# Patient Record
Sex: MALE | Race: WHITE | Employment: FULL TIME | URBAN - METROPOLITAN AREA
[De-identification: names, ages, dates, MRNs, and addresses within clinical notes are randomized per-mention and may not be internally consistent; named-entity substitution may affect disease eponyms.]

---

## 2018-07-04 ENCOUNTER — HOSPITAL ENCOUNTER (EMERGENCY)
Age: 28
Discharge: HOME | End: 2018-07-04
Attending: EMERGENCY MEDICINE
Payer: COMMERCIAL

## 2018-07-04 VITALS
OXYGEN SATURATION: 100 % | BODY MASS INDEX: 25.46 KG/M2 | TEMPERATURE: 98 F | SYSTOLIC BLOOD PRESSURE: 166 MMHG | HEIGHT: 67 IN | DIASTOLIC BLOOD PRESSURE: 80 MMHG | HEART RATE: 67 BPM | RESPIRATION RATE: 18 BRPM | WEIGHT: 162.2 LBS

## 2018-07-04 DIAGNOSIS — K29.00 ACUTE SUPERFICIAL GASTRITIS WITHOUT HEMORRHAGE: Primary | ICD-10-CM

## 2018-07-04 PROCEDURE — 81003 URINALYSIS AUTO W/O SCOPE: CPT

## 2018-07-04 PROCEDURE — 6370000000 HC RX 637 (ALT 250 FOR IP): Performed by: NURSE PRACTITIONER

## 2018-07-04 PROCEDURE — 99283 EMERGENCY DEPT VISIT LOW MDM: CPT

## 2018-07-04 RX ORDER — OMEPRAZOLE AND SODIUM BICARBONATE 40; 1100 MG/1; MG/1
1 CAPSULE ORAL
Qty: 30 CAPSULE | Refills: 0 | Status: SHIPPED | OUTPATIENT
Start: 2018-07-04 | End: 2019-11-06 | Stop reason: ALTCHOICE

## 2018-07-04 RX ADMIN — LIDOCAINE HYDROCHLORIDE: 20 SOLUTION ORAL; TOPICAL at 21:42

## 2018-07-04 ASSESSMENT — PAIN DESCRIPTION - FREQUENCY: FREQUENCY: CONTINUOUS

## 2018-07-04 ASSESSMENT — PAIN DESCRIPTION - LOCATION: LOCATION: ABDOMEN

## 2018-07-04 ASSESSMENT — PAIN DESCRIPTION - PAIN TYPE: TYPE: ACUTE PAIN

## 2018-07-04 ASSESSMENT — PAIN DESCRIPTION - ORIENTATION: ORIENTATION: UPPER

## 2018-07-04 ASSESSMENT — PAIN SCALES - GENERAL: PAINLEVEL_OUTOF10: 8

## 2018-07-05 NOTE — ED PROVIDER NOTES
[facility] ED  eMERGENCY dEPARTMENT eNCOUnter      Pt Name: Rishi Henning  MRN: [id number]  [place] 1990  Date of evaluation: 7/4/2018  Provider: DHEERAJ Vázquez 5078       Chief Complaint   Patient presents with    Abdominal Pain    Emesis         HISTORY OF PRESENT ILLNESS  (Location/Symptom, Timing/Onset, Context/Setting, Quality, Duration, Modifying Factors, Severity.)   Rishi Henning is a 32 y.o. male who presents to the emergency department Complains of epigastric pain. Onset at 2 PM today. He states he had a burning cramping type pain he ate dinner and the pain became worse. He vomited once. He denies any blood in the vomit. He was nauseated at that time also. He took Gas-X without much improvement in the symptoms. He denies any hematochezia or hematemesis or melena. No sick contacts. He is a nonsmoker. He only occasionally drinks, approximately every 2 months. No intake recently. Nursing Notes were reviewed. ALLERGIES     Patient has no known allergies. CURRENT MEDICATIONS       Discharge Medication List as of 7/4/2018 10:09 PM      CONTINUE these medications which have NOT CHANGED    Details   HYDROcodone-acetaminophen (NORCO) 5-325 MG per tablet take 1-2 tablets by mouth every 6 hours if needed for pain, R-0      gabapentin (NEURONTIN) 100 MG capsule Take 1 capsule by mouth 3 times daily, Disp-90 capsule, R-3      clotrimazole-betamethasone (LOTRISONE) 1-0.05 % cream Apply topically 2 times daily. , Disp-15 g, R-0, Normal      hydrocortisone 2.5 % cream Apply topically 2 times daily. , Disp-15 g, R-0, Normal      hydrocortisone valerate (WESTCORT) 0.2 % ointment Apply topically daily. , Disp-15 g, R-1, Normal      ibuprofen (ADVIL;MOTRIN) 800 MG tablet Take 1 tablet by mouth every 8 hours as needed for Pain, Disp-30 tablet, R-0             PAST MEDICAL HISTORY   No past medical history on file.     SURGICAL HISTORY           Procedure Laterality Date    SHOULDER ARTHROSCOPY Right     SHOULDER SURGERY Right          FAMILY HISTORY       Family History   Problem Relation Age of Onset    Diabetes Paternal Grandmother     Cancer Paternal Grandmother         stomach    Cancer Paternal Uncle      Family Status   Relation Status    Mother Alive    Father Alive    [address]     PUn (Not Specified)        SOCIAL HISTORY      reports that he has been smoking. He has been smoking about 0.50 packs per day. He has never used smokeless tobacco. He reports that he drinks alcohol. He reports that he does not use drugs. REVIEW OF SYSTEMS    (2-9 systems for level 4, 10 or more for level 5)     REVIEW OF SYSTEMS    Constitutional:  Denies fever, chills, or weakness   Eyes:  Denies vision changes  HENT:  Denies sore throat or neck pain   Respiratory:  Denies cough or shortness of breath   Cardiovascular:  Denies chest pain  GI:  Complains of epigastric abdominal pain, nausea, vomiting. No diarrhea. Musculoskeletal:  Denies back pain   Skin:  Denies rash  : All systems negative except as marked. Except as noted above the remainder of the review of systems was reviewed and negative. PHYSICAL EXAM    (up to 7 for level 4, 8 or more for level 5)     ED Triage Vitals [18 2103]   BP Temp Temp Source Pulse Resp SpO2 Height Weight   (!) 166/80 98 °F (36.7 °C) Oral 67 18 100 % 5' 7\" (1.702 m) 162 lb 3.2 oz (73.6 kg)       General Appearance:  Alert, cooperative, no distress, appears stated age. Head:  Normocephalic, without obvious abnormality, atraumatic. Eyes:  conjunctiva/corneas clear, EOM's intact. Sclera anicteric. ENT: Mucous membranes moist.   Neck: Supple, symmetrical, trachea midline, no adenopathy. Lungs:   Respirations eupneic. Lungs CTA   Chest Wall:  Nontender   Heart:   Abdomen:           Extremities:   Pulses:   Skin:   Neurologic:      RRR  Soft,Mildly tender over the epigastric area. There is no guarding.   Bowel sounds CNP  07/05/18 0149

## 2019-11-06 PROBLEM — G50.0 TRIGEMINAL NEURALGIA: Status: ACTIVE | Noted: 2019-11-06

## 2020-01-03 ENCOUNTER — HOSPITAL ENCOUNTER (OUTPATIENT)
Dept: GENERAL RADIOLOGY | Facility: CLINIC | Age: 30
Discharge: HOME | End: 2020-01-05
Payer: COMMERCIAL

## 2020-01-03 PROCEDURE — 73110 X-RAY EXAM OF WRIST: CPT

## 2020-02-13 ENCOUNTER — OFFICE VISIT (OUTPATIENT)
Dept: NEUROLOGY | Age: 30
End: 2020-02-13
Payer: COMMERCIAL

## 2020-02-13 VITALS
HEART RATE: 82 BPM | SYSTOLIC BLOOD PRESSURE: 137 MMHG | BODY MASS INDEX: 29.73 KG/M2 | DIASTOLIC BLOOD PRESSURE: 76 MMHG | HEIGHT: 67 IN | WEIGHT: 189.4 LBS

## 2020-02-13 PROCEDURE — 99204 OFFICE O/P NEW MOD 45 MIN: CPT | Performed by: PSYCHIATRY & NEUROLOGY

## 2020-02-13 RX ORDER — BACLOFEN 10 MG/1
TABLET ORAL
Qty: 30 TABLET | Refills: 2 | Status: SHIPPED | OUTPATIENT
Start: 2020-02-13 | End: 2021-02-13

## 2020-02-13 RX ORDER — CARBAMAZEPINE 100 MG/1
100 TABLET, CHEWABLE ORAL 2 TIMES DAILY
Qty: 120 TABLET | Refills: 2 | Status: SHIPPED | OUTPATIENT
Start: 2020-02-13

## 2020-02-13 NOTE — PROGRESS NOTES
NEUROLOGY CONSULT  Patient Name:       Hossein Barclay  :        1990  Clinic Visit Date:    2020    Dear Dr. Benjamin Wellington MD     I had the opportunity to see your patient, Mr. Hossein Barclay in neurology consultation today. As you know he  is a 30 y.o. right handed  male presented with c/o right facial pain. He stated that he was diagnosed with trigeminal neuralgia by neurologist in [facility] clinic and that neurologist is not practicing anymore. Therefore he comes to clinic for continuation of the care. His right facial pain is moderately intense in severity worsening up to 10/10 on occasion and pain gets worse with ice water. This pain is predominantly located in right lower face. Chewing helps actually but not making it worse. At times this pain gets so severe that he clenches his teeth with resultant severe soreness. He has had difficulty sleeping with this ongoing pain. He also stated that this pain has been persistent since 2015. The onset of facial pain in ; he was evaluated by 2 different dentists. They did not find any pathology with his dentition. It was initially thought to be sinus infection and he has had CT head for evaluation of sinus and it was negative. Then he was referred to a neurologist at [facility]. He was evaluated by Dr. Pinky Waterman. Patient had MRI brain in . That was unremarkable. He was initiated on gabapentin and Flexeril and later he was advised to take hydrocodone. Patient lost insurance in  and he could not see any physician until now. Patient has been off of any medications and he has been taking NSAIDs to get any relief. Presently his pain is becoming intense. His pain is located predominantly in right lower face and in teeth as well. He has fluctuations in severity of the pain and most of the times it is at 6/10 severity. Pain is described as burning pain with occasional paroxysms of sharp pain.   His pain is always in right lower face. On occasion it did extend to backside of his neck. Cold air and sipping ice water occasionally makes the pain worse. He was also on Flexeril in the past with some relief. He did not have any associated weakness in the face. He also denied speech and swallowing difficulties. He denies headache. Denies photosensitivity or phonophobia. Denies nausea and vomiting. Denied hx of migraine headaches. Denied trauma. Review of systems done by staff reviewed and pertinent positives include facial pain as stated above. No current outpatient medications on file prior to visit. No current facility-administered medications on file prior to visit. Allergies: Patito Bowen has No Known Allergies. History reviewed. No pertinent past medical history. Past Surgical History:   Procedure Laterality Date    SHOULDER ARTHROSCOPY Right     SHOULDER SURGERY Right     WISDOM TOOTH EXTRACTION       Social History: Patito Bowen  reports that he has quit smoking. He smoked 0.50 packs per day. He has never used smokeless tobacco. He reports current alcohol use. He reports that he does not use drugs. Family History   Problem Relation Age of Onset    Diabetes Paternal Grandmother     Cancer Paternal Grandmother         stomach    Cancer Paternal Uncle     Seizures Brother        On exam: Blood pressure 137/76, pulse 82, height 5' 7\" (1.702 m), weight 189 lb 6.4 oz (85.9 kg).     GENERAL  Appears comfortable and in no distress   HEENT  NC/ AT   NECK  Supple and no bruits heard   MENTAL STATUS:  Alert, oriented, intact memory, no confusion, normal speech, normal language, no hallucination or delusion   CRANIAL NERVES: II     -      PERRLA; fundi reveal intact venous pulsations; visual fields intact to confrontation  III,IV,VI -  EOMs full, no afferent defect, no                      LULU, no ptosis  V     -     Normal facial sensation  VII    -     Normal facial symmetry  VIII   -     Intact hearing  IX,X -     Symmetrical palate  XI    -     Symmetrical shoulder shrug  XII   -     Midline tongue, no atrophy    MOTOR FUNCTION:  significant for good strength of grade 5/5 in bilateral proximal and distal muscle groups of both upper and lower extremities with normal bulk, normal tone and no involuntary movements, no tremor   SENSORY FUNCTION:  Normal touch, normal pin, normal vibration, normal proprioception   CEREBELLAR FUNCTION:  Intact fine motor control over upper limbs   REFLEX FUNCTION:  Symmetric, no perverted reflex, no Babinski sign   STATION and GAIT  Normal station, normal gait     MRI Brain (w/wo) 8/30/2016 done at [address] [facility] facility:  Read by Dr. Bryan Gregorio as  Negative contrast brain MRI. Impression and Plan: Mr. Toshia Mcnulty is a 30 y.o. male with   Five year hx of constant right lower facial pain at moderately severe intensity with occasional paroxysms of sharp pain with triggers including cold air, ice water sipping, etc.    This presentation raises the concern for right lower trigeminal neuralgia. Will get MRI brain with contrast to rule out demyelinating disease and secondary etiology. Will also start him on carbamazepine 100 mg twice daily with gradual dose escalation. Also to start him on baclofen 5 mg nightly. Medication counseling such as risks and benefits including possible adverse effects such as dizziness, drowsiness, double vision, etc. Discussed. Follow-up in 2-3 months. Please note that portions of this note were completed with a voice recognition program.  Although every effort was made to ensure the accuracy of this automated transcription, some errors in transcription may have occurred; occasionally words are mis-transcribed. Thank you for understanding.

## 2020-02-13 NOTE — LETTER
[facility]  [address] [facility] [address]  Phone: [phone]  Fax: [phone]    Edward Oliver MD        2020     Mariaa Blankenship    Patient: Patito Bowen  MR Number: [id number]  YOB: 1990  Date of Visit: 2020    Dear Dr. Vi Peterson: Thank you for the request for consultation for Harshad Levy to me for the evaluation of BENNETT MONROY  Below are the relevant portions of my assessment and plan of care. NEUROLOGY CONSULT  Patient Name:       Patito Bowen  :        1990  Clinic Visit Date:    2020    Dear Dr. Vi Peterson MD     I had the opportunity to see your patient, Mr. Patito Bowen in neurology consultation today. As you know he  is a 30 y.o. right handed  male presented with c/o right facial pain. He stated that he was diagnosed with trigeminal neuralgia by neurologist in [facility] clinic and that neurologist is not practicing anymore. Therefore he comes to clinic for continuation of the care. His right facial pain is moderately intense in severity worsening up to 10/10 on occasion and pain gets worse with ice water. This pain is predominantly located in right lower face. Chewing helps actually but not making it worse. At times this pain gets so severe that he clenches his teeth with resultant severe soreness. He has had difficulty sleeping with this ongoing pain. He also stated that this pain has been persistent since 2015. The onset of facial pain in ; he was evaluated by 2 different dentists. They did not find any pathology with his dentition. It was initially thought to be sinus infection and he has had CT head for evaluation of sinus and it was negative. Then he was referred to a neurologist at [facility].   He was evaluated  Cancer Paternal Uncle     Seizures Brother        On exam: Blood pressure 137/76, pulse 82, height 5' 7\" (1.702 m), weight 189 lb 6.4 oz (85.9 kg). GENERAL  Appears comfortable and in no distress   HEENT  NC/ AT   NECK  Supple and no bruits heard   MENTAL STATUS:  Alert, oriented, intact memory, no confusion, normal speech, normal language, no hallucination or delusion   CRANIAL NERVES: II     -      PERRLA; fundi reveal intact venous pulsations; visual fields intact to confrontation  III,IV,VI -  EOMs full, no afferent defect, no                      LULU, no ptosis  V     -     Normal facial sensation  VII    -     Normal facial symmetry  VIII   -     Intact hearing  IX,X -     Symmetrical palate  XI    -     Symmetrical shoulder shrug  XII   -     Midline tongue, no atrophy    MOTOR FUNCTION:  significant for good strength of grade 5/5 in bilateral proximal and distal muscle groups of both upper and lower extremities with normal bulk, normal tone and no involuntary movements, no tremor   SENSORY FUNCTION:  Normal touch, normal pin, normal vibration, normal proprioception   CEREBELLAR FUNCTION:  Intact fine motor control over upper limbs   REFLEX FUNCTION:  Symmetric, no perverted reflex, no Babinski sign   STATION and GAIT  Normal station, normal gait     MRI Brain (w/wo) 8/30/2016 done at [address] facility:  Read by Dr. Mitch Prakash as  Negative contrast brain MRI. Impression and Plan: Mr. Patito Bowen is a 30 y.o. male with   Five year hx of constant right lower facial pain at moderately severe intensity with occasional paroxysms of sharp pain with triggers including cold air, ice water sipping, etc.    This presentation raises the concern for right lower trigeminal neuralgia. Will get MRI brain with contrast to rule out demyelinating disease and secondary etiology.     Will also start him on carbamazepine 100 mg twice daily with gradual dose escalation. Also to start him on baclofen 5 mg nightly. Medication counseling such as risks and benefits including possible adverse effects such as dizziness, drowsiness, double vision, etc. Discussed. Follow-up in 2-3 months. Please note that portions of this note were completed with a voice recognition program.  Although every effort was made to ensure the accuracy of this automated transcription, some errors in transcription may have occurred; occasionally words are mis-transcribed. Thank you for understanding. If you have questions, please do not hesitate to call me. I look forward to following Afshan Wesley along with you.     Sincerely,        Lexx Lim MD

## 2020-02-13 NOTE — PROGRESS NOTES
NEUROLOGY FOLLOW-UP  Patient Name:  Killian Bush  :   1990  Clinic Visit Date: 2020        REVIEW OF SYSTEMS  Constitutional Weight changes: absent, Fevers : absent, Fatigue: absent; Any recent hospitalizations:  absent.    HEENT Ears: ear pain, Eyes: no correction, Visual disturbance: absent   Reespiratory Shortness of breath: absent, Cough: absent   Cardivascular Chest pain: absent, Leg swelling :absent   GI Constipation: absent, Diarrhea: absent, Swallowing change: absent    Urinary frequency: absent, Urinary urgency: absent, Urinary incontinence: absent   Musculoskeletal Neck pain: absent, Back pain: absent, Stiffness: absent, Muscle pain: absent, Joint pain: absent   Dermatological Hair loss: absent, Skin changes: absent   Neurological Memory loss: absent, Confusion: absent, Seizures: absent; Trouble walking or imbalance: absent, Dizziness: absent, Weakness: absent, Numbness absent, Tremor: absent, Spasm: absent, Speech difficulty: absent, Headache: present, Light sensitivity: absent   Psychiatric Anxiety: absent, Depression  absent, Suicidal ideations absent   Hematologic Abnormal bleeding: absent, Anemia: absent, Clotting disorder: absent, Lymph gland changes: absent

## 2020-04-28 ENCOUNTER — TELEPHONE (OUTPATIENT)
Dept: NEUROLOGY | Age: 30
End: 2020-04-28

## 2020-04-28 NOTE — TELEPHONE ENCOUNTER
Per [facility] the patient has cancelled his MRI scan there 5 times and they will not reschedule.   KS

## 2020-06-23 ENCOUNTER — TELEPHONE (OUTPATIENT)
Dept: NEUROLOGY | Age: 30
End: 2020-06-23